# Patient Record
Sex: FEMALE | Race: WHITE | NOT HISPANIC OR LATINO
[De-identification: names, ages, dates, MRNs, and addresses within clinical notes are randomized per-mention and may not be internally consistent; named-entity substitution may affect disease eponyms.]

---

## 2018-05-14 ENCOUNTER — APPOINTMENT (OUTPATIENT)
Dept: ORTHOPEDIC SURGERY | Facility: CLINIC | Age: 58
End: 2018-05-14
Payer: COMMERCIAL

## 2018-05-14 VITALS
WEIGHT: 168 LBS | DIASTOLIC BLOOD PRESSURE: 78 MMHG | SYSTOLIC BLOOD PRESSURE: 121 MMHG | HEIGHT: 65 IN | HEART RATE: 81 BPM | BODY MASS INDEX: 27.99 KG/M2

## 2018-05-14 DIAGNOSIS — Z78.9 OTHER SPECIFIED HEALTH STATUS: ICD-10-CM

## 2018-05-14 DIAGNOSIS — Z82.61 FAMILY HISTORY OF ARTHRITIS: ICD-10-CM

## 2018-05-14 DIAGNOSIS — Z86.39 PERSONAL HISTORY OF OTHER ENDOCRINE, NUTRITIONAL AND METABOLIC DISEASE: ICD-10-CM

## 2018-05-14 PROCEDURE — 99244 OFF/OP CNSLTJ NEW/EST MOD 40: CPT

## 2018-05-14 RX ORDER — MULTIVITAMIN
TABLET ORAL
Refills: 0 | Status: ACTIVE | COMMUNITY

## 2018-05-14 RX ORDER — CHROMIUM 200 MCG
TABLET ORAL
Refills: 0 | Status: ACTIVE | COMMUNITY

## 2018-05-14 RX ORDER — CYANOCOBALAMIN (VITAMIN B-12) 1000 MCG
TABLET ORAL
Refills: 0 | Status: ACTIVE | COMMUNITY

## 2018-05-14 RX ORDER — PSYLLIUM HUSK 0.4 G
CAPSULE ORAL
Refills: 0 | Status: ACTIVE | COMMUNITY

## 2018-05-29 ENCOUNTER — APPOINTMENT (OUTPATIENT)
Dept: ORTHOPEDIC SURGERY | Facility: CLINIC | Age: 58
End: 2018-05-29
Payer: COMMERCIAL

## 2018-05-29 PROCEDURE — 99214 OFFICE O/P EST MOD 30 MIN: CPT

## 2018-06-18 ENCOUNTER — APPOINTMENT (OUTPATIENT)
Dept: MRI IMAGING | Facility: CLINIC | Age: 58
End: 2018-06-18

## 2018-06-22 ENCOUNTER — APPOINTMENT (OUTPATIENT)
Dept: ORTHOPEDIC SURGERY | Facility: CLINIC | Age: 58
End: 2018-06-22
Payer: OTHER MISCELLANEOUS

## 2018-06-22 DIAGNOSIS — M23.92 UNSPECIFIED INTERNAL DERANGEMENT OF LEFT KNEE: ICD-10-CM

## 2018-06-22 PROCEDURE — 99214 OFFICE O/P EST MOD 30 MIN: CPT

## 2020-09-25 ENCOUNTER — OUTPATIENT (OUTPATIENT)
Dept: OUTPATIENT SERVICES | Facility: HOSPITAL | Age: 60
LOS: 1 days | End: 2020-09-25
Payer: COMMERCIAL

## 2020-09-25 VITALS
DIASTOLIC BLOOD PRESSURE: 85 MMHG | HEART RATE: 77 BPM | TEMPERATURE: 98 F | OXYGEN SATURATION: 98 % | HEIGHT: 65 IN | WEIGHT: 170.86 LBS | SYSTOLIC BLOOD PRESSURE: 140 MMHG

## 2020-09-25 DIAGNOSIS — Z01.818 ENCOUNTER FOR OTHER PREPROCEDURAL EXAMINATION: ICD-10-CM

## 2020-09-25 DIAGNOSIS — Z98.890 OTHER SPECIFIED POSTPROCEDURAL STATES: Chronic | ICD-10-CM

## 2020-09-25 DIAGNOSIS — N81.2 INCOMPLETE UTEROVAGINAL PROLAPSE: ICD-10-CM

## 2020-09-25 LAB
A1C WITH ESTIMATED AVERAGE GLUCOSE RESULT: 5.5 % — SIGNIFICANT CHANGE UP (ref 4–5.6)
ANION GAP SERPL CALC-SCNC: 4 MMOL/L — LOW (ref 5–17)
APPEARANCE UR: CLEAR — SIGNIFICANT CHANGE UP
BASOPHILS # BLD AUTO: 0.05 K/UL — SIGNIFICANT CHANGE UP (ref 0–0.2)
BASOPHILS NFR BLD AUTO: 1.5 % — SIGNIFICANT CHANGE UP (ref 0–2)
BILIRUB UR-MCNC: NEGATIVE — SIGNIFICANT CHANGE UP
BUN SERPL-MCNC: 16 MG/DL — SIGNIFICANT CHANGE UP (ref 7–23)
CALCIUM SERPL-MCNC: 9.1 MG/DL — SIGNIFICANT CHANGE UP (ref 8.5–10.1)
CHLORIDE SERPL-SCNC: 109 MMOL/L — HIGH (ref 96–108)
CO2 SERPL-SCNC: 28 MMOL/L — SIGNIFICANT CHANGE UP (ref 22–31)
COLOR SPEC: YELLOW — SIGNIFICANT CHANGE UP
CREAT SERPL-MCNC: 0.74 MG/DL — SIGNIFICANT CHANGE UP (ref 0.5–1.3)
DIFF PNL FLD: ABNORMAL
EOSINOPHIL # BLD AUTO: 0.07 K/UL — SIGNIFICANT CHANGE UP (ref 0–0.5)
EOSINOPHIL NFR BLD AUTO: 2.1 % — SIGNIFICANT CHANGE UP (ref 0–6)
ESTIMATED AVERAGE GLUCOSE: 111 MG/DL — SIGNIFICANT CHANGE UP (ref 68–114)
GLUCOSE SERPL-MCNC: 84 MG/DL — SIGNIFICANT CHANGE UP (ref 70–99)
GLUCOSE UR QL: NEGATIVE MG/DL — SIGNIFICANT CHANGE UP
HCT VFR BLD CALC: 41.2 % — SIGNIFICANT CHANGE UP (ref 34.5–45)
HGB BLD-MCNC: 14 G/DL — SIGNIFICANT CHANGE UP (ref 11.5–15.5)
IMM GRANULOCYTES NFR BLD AUTO: 0.9 % — SIGNIFICANT CHANGE UP (ref 0–1.5)
KETONES UR-MCNC: NEGATIVE — SIGNIFICANT CHANGE UP
LEUKOCYTE ESTERASE UR-ACNC: ABNORMAL
LYMPHOCYTES # BLD AUTO: 0.78 K/UL — LOW (ref 1–3.3)
LYMPHOCYTES # BLD AUTO: 23.9 % — SIGNIFICANT CHANGE UP (ref 13–44)
MCHC RBC-ENTMCNC: 32.8 PG — SIGNIFICANT CHANGE UP (ref 27–34)
MCHC RBC-ENTMCNC: 34 GM/DL — SIGNIFICANT CHANGE UP (ref 32–36)
MCV RBC AUTO: 96.5 FL — SIGNIFICANT CHANGE UP (ref 80–100)
MONOCYTES # BLD AUTO: 0.31 K/UL — SIGNIFICANT CHANGE UP (ref 0–0.9)
MONOCYTES NFR BLD AUTO: 9.5 % — SIGNIFICANT CHANGE UP (ref 2–14)
NEUTROPHILS # BLD AUTO: 2.02 K/UL — SIGNIFICANT CHANGE UP (ref 1.8–7.4)
NEUTROPHILS NFR BLD AUTO: 62.1 % — SIGNIFICANT CHANGE UP (ref 43–77)
NITRITE UR-MCNC: NEGATIVE — SIGNIFICANT CHANGE UP
PH UR: 5 — SIGNIFICANT CHANGE UP (ref 5–8)
PLATELET # BLD AUTO: 198 K/UL — SIGNIFICANT CHANGE UP (ref 150–400)
POTASSIUM SERPL-MCNC: 3.9 MMOL/L — SIGNIFICANT CHANGE UP (ref 3.5–5.3)
POTASSIUM SERPL-SCNC: 3.9 MMOL/L — SIGNIFICANT CHANGE UP (ref 3.5–5.3)
PROT UR-MCNC: 15 MG/DL
RBC # BLD: 4.27 M/UL — SIGNIFICANT CHANGE UP (ref 3.8–5.2)
RBC # FLD: 12.7 % — SIGNIFICANT CHANGE UP (ref 10.3–14.5)
SODIUM SERPL-SCNC: 141 MMOL/L — SIGNIFICANT CHANGE UP (ref 135–145)
SP GR SPEC: 1.02 — SIGNIFICANT CHANGE UP (ref 1.01–1.02)
UROBILINOGEN FLD QL: NEGATIVE MG/DL — SIGNIFICANT CHANGE UP
WBC # BLD: 3.26 K/UL — LOW (ref 3.8–10.5)
WBC # FLD AUTO: 3.26 K/UL — LOW (ref 3.8–10.5)

## 2020-09-25 PROCEDURE — 80048 BASIC METABOLIC PNL TOTAL CA: CPT

## 2020-09-25 PROCEDURE — 93005 ELECTROCARDIOGRAM TRACING: CPT

## 2020-09-25 PROCEDURE — 86850 RBC ANTIBODY SCREEN: CPT

## 2020-09-25 PROCEDURE — G0463: CPT | Mod: 25

## 2020-09-25 PROCEDURE — 83036 HEMOGLOBIN GLYCOSYLATED A1C: CPT

## 2020-09-25 PROCEDURE — 86900 BLOOD TYPING SEROLOGIC ABO: CPT

## 2020-09-25 PROCEDURE — 36415 COLL VENOUS BLD VENIPUNCTURE: CPT

## 2020-09-25 PROCEDURE — 81001 URINALYSIS AUTO W/SCOPE: CPT

## 2020-09-25 PROCEDURE — 86901 BLOOD TYPING SEROLOGIC RH(D): CPT

## 2020-09-25 PROCEDURE — 93010 ELECTROCARDIOGRAM REPORT: CPT

## 2020-09-25 PROCEDURE — 85025 COMPLETE CBC W/AUTO DIFF WBC: CPT

## 2020-09-25 NOTE — H&P PST ADULT - HEALTH CARE MAINTENANCE
flu vaccine not current - information given     Denies travel outside of state or country in the last 14 days.   Denies contact with known Coronavirus positive person.  Denies fever, chills, cough, congestion, runny nose, SOB or difficulty breathing, fatigue, muscle or body ache, headache. loss of taste or smell, N/V/D.

## 2020-09-25 NOTE — H&P PST ADULT - ASSESSMENT
60 year old woman with history of hypothyroid presents to PST for preprocedure exam. Patient is for planned robotic assisted laparoscopic supracervical hysterectomy , BL salpingooophorectomy with suburethral sling with Dr Carter.       Plan:  - PST instructions given ; NPO status instructions to be given by ASU .  - Pt instructed to take following meds with sip of water : synthroid  - Pt instructed to take routine evening medications unless indicated .  -  Stop NSAIDS ( Aspirin Alev Motrin Mobic Diclofenac), herbal supplements , MVI , Vitamin fish oil 7 days prior to surgery  unless   directed by surgeon or cardiologist;   - Medical Optimization  with Dr Duque  - EZ wash instructions given   - Labs EKG  as per surgeon request.   -  Pt instructed to self quarantine .  - Covid Testing scheduled on ___10/3______.  Pt notified and aware.  - Pt denies covid symptoms shortness of breath fever cough .

## 2020-09-25 NOTE — H&P PST ADULT - GENERAL GENITOURINARY SYMPTOMS
feels that she is not emptying her bladder completely/ at times dribbling urine/increased urinary frequency

## 2020-09-25 NOTE — H&P PST ADULT - RS GEN PE MLT RESP DETAILS PC
clear to auscultation bilaterally/no rales/breath sounds equal/good air movement/respirations non-labored/no rhonchi/normal/airway patent

## 2020-09-25 NOTE — H&P PST ADULT - HISTORY OF PRESENT ILLNESS
60 year old woman with history of hypothyroid presents to [facility] for preprocedure exam. Patient is for planned robotic assisted laparoscopic supracervical hysterectomy , BL salpingooophorectomy with suburethral sling with Dr Carter. As per patient she's had uterine prolapse since last year but symptoms has worsened - she endorses to urinary frequency, incomplete emptying of bladder and at times dribbling of urine and nocturia.  She denies recent UTI, 60 year old woman with history of hypothyroid presents to [facility] for preprocedure exam. Patient is for planned robotic assisted laparoscopic supracervical hysterectomy , BL salpingooophorectomy with suburethral sling with Dr Carter. As per patient she's had uterine prolapse since last year but symptoms has worsened - she endorses to urinary frequency, incomplete emptying of bladder and at times dribbling of urine and nocturia.  She denies recent UTI.

## 2020-09-26 DIAGNOSIS — N81.2 INCOMPLETE UTEROVAGINAL PROLAPSE: ICD-10-CM

## 2020-09-26 DIAGNOSIS — Z01.818 ENCOUNTER FOR OTHER PREPROCEDURAL EXAMINATION: ICD-10-CM

## 2020-10-03 ENCOUNTER — OUTPATIENT (OUTPATIENT)
Dept: OUTPATIENT SERVICES | Facility: HOSPITAL | Age: 60
LOS: 1 days | End: 2020-10-03
Payer: COMMERCIAL

## 2020-10-03 DIAGNOSIS — Z98.890 OTHER SPECIFIED POSTPROCEDURAL STATES: Chronic | ICD-10-CM

## 2020-10-03 DIAGNOSIS — Z11.59 ENCOUNTER FOR SCREENING FOR OTHER VIRAL DISEASES: ICD-10-CM

## 2020-10-03 PROCEDURE — U0003: CPT

## 2020-10-04 DIAGNOSIS — Z11.59 ENCOUNTER FOR SCREENING FOR OTHER VIRAL DISEASES: ICD-10-CM

## 2020-10-04 LAB — SARS-COV-2 RNA SPEC QL NAA+PROBE: SIGNIFICANT CHANGE UP

## 2020-10-06 ENCOUNTER — OUTPATIENT (OUTPATIENT)
Dept: INPATIENT UNIT | Facility: HOSPITAL | Age: 60
LOS: 1 days | Discharge: HOME | End: 2020-10-06
Payer: COMMERCIAL

## 2020-10-06 ENCOUNTER — RESULT REVIEW (OUTPATIENT)
Age: 60
End: 2020-10-06

## 2020-10-06 VITALS
HEART RATE: 72 BPM | TEMPERATURE: 99 F | DIASTOLIC BLOOD PRESSURE: 76 MMHG | RESPIRATION RATE: 18 BRPM | WEIGHT: 169.98 LBS | OXYGEN SATURATION: 99 % | HEIGHT: 65 IN | SYSTOLIC BLOOD PRESSURE: 141 MMHG

## 2020-10-06 DIAGNOSIS — N81.2 INCOMPLETE UTEROVAGINAL PROLAPSE: ICD-10-CM

## 2020-10-06 DIAGNOSIS — Z98.890 OTHER SPECIFIED POSTPROCEDURAL STATES: Chronic | ICD-10-CM

## 2020-10-06 DIAGNOSIS — N81.11 CYSTOCELE, MIDLINE: ICD-10-CM

## 2020-10-06 LAB
HCT VFR BLD CALC: 37.1 % — SIGNIFICANT CHANGE UP (ref 34.5–45)
HGB BLD-MCNC: 12.4 G/DL — SIGNIFICANT CHANGE UP (ref 11.5–15.5)

## 2020-10-06 PROCEDURE — 82962 GLUCOSE BLOOD TEST: CPT

## 2020-10-06 PROCEDURE — 57288 REPAIR BLADDER DEFECT: CPT | Mod: AS

## 2020-10-06 PROCEDURE — S2900: CPT

## 2020-10-06 PROCEDURE — 88305 TISSUE EXAM BY PATHOLOGIST: CPT | Mod: 26

## 2020-10-06 PROCEDURE — 57425 LAPAROSCOPY SURG COLPOPEXY: CPT | Mod: AS

## 2020-10-06 PROCEDURE — 88305 TISSUE EXAM BY PATHOLOGIST: CPT

## 2020-10-06 PROCEDURE — 36415 COLL VENOUS BLD VENIPUNCTURE: CPT

## 2020-10-06 PROCEDURE — C1889: CPT

## 2020-10-06 PROCEDURE — C1771: CPT

## 2020-10-06 PROCEDURE — C1781: CPT

## 2020-10-06 PROCEDURE — 85018 HEMOGLOBIN: CPT

## 2020-10-06 PROCEDURE — 85014 HEMATOCRIT: CPT

## 2020-10-06 PROCEDURE — 58542 LSH W/T/O UT 250 G OR LESS: CPT | Mod: AS

## 2020-10-06 RX ORDER — ONDANSETRON 8 MG/1
4 TABLET, FILM COATED ORAL ONCE
Refills: 0 | Status: DISCONTINUED | OUTPATIENT
Start: 2020-10-06 | End: 2020-10-06

## 2020-10-06 RX ORDER — SODIUM CHLORIDE 9 MG/ML
1000 INJECTION, SOLUTION INTRAVENOUS
Refills: 0 | Status: DISCONTINUED | OUTPATIENT
Start: 2020-10-06 | End: 2020-10-06

## 2020-10-06 RX ORDER — OXYCODONE AND ACETAMINOPHEN 5; 325 MG/1; MG/1
2 TABLET ORAL EVERY 4 HOURS
Refills: 0 | Status: DISCONTINUED | OUTPATIENT
Start: 2020-10-06 | End: 2020-10-07

## 2020-10-06 RX ORDER — LEVOTHYROXINE SODIUM 125 MCG
1 TABLET ORAL
Qty: 0 | Refills: 0 | DISCHARGE

## 2020-10-06 RX ORDER — ONDANSETRON 8 MG/1
4 TABLET, FILM COATED ORAL EVERY 6 HOURS
Refills: 0 | Status: DISCONTINUED | OUTPATIENT
Start: 2020-10-06 | End: 2020-10-07

## 2020-10-06 RX ORDER — FENTANYL CITRATE 50 UG/ML
50 INJECTION INTRAVENOUS
Refills: 0 | Status: DISCONTINUED | OUTPATIENT
Start: 2020-10-06 | End: 2020-10-06

## 2020-10-06 RX ORDER — OMEGA-3 ACID ETHYL ESTERS 1 G
0 CAPSULE ORAL
Qty: 0 | Refills: 0 | DISCHARGE

## 2020-10-06 RX ORDER — OXYCODONE HYDROCHLORIDE 5 MG/1
10 TABLET ORAL ONCE
Refills: 0 | Status: DISCONTINUED | OUTPATIENT
Start: 2020-10-06 | End: 2020-10-06

## 2020-10-06 RX ORDER — OXYCODONE AND ACETAMINOPHEN 5; 325 MG/1; MG/1
1 TABLET ORAL EVERY 4 HOURS
Refills: 0 | Status: DISCONTINUED | OUTPATIENT
Start: 2020-10-06 | End: 2020-10-07

## 2020-10-06 RX ORDER — IBUPROFEN 200 MG
600 TABLET ORAL EVERY 6 HOURS
Refills: 0 | Status: DISCONTINUED | OUTPATIENT
Start: 2020-10-06 | End: 2020-10-07

## 2020-10-06 RX ORDER — HYDROMORPHONE HYDROCHLORIDE 2 MG/ML
1 INJECTION INTRAMUSCULAR; INTRAVENOUS; SUBCUTANEOUS EVERY 4 HOURS
Refills: 0 | Status: DISCONTINUED | OUTPATIENT
Start: 2020-10-06 | End: 2020-10-07

## 2020-10-06 RX ADMIN — Medication 600 MILLIGRAM(S): at 23:47

## 2020-10-06 RX ADMIN — OXYCODONE HYDROCHLORIDE 10 MILLIGRAM(S): 5 TABLET ORAL at 19:50

## 2020-10-06 RX ADMIN — FENTANYL CITRATE 50 MICROGRAM(S): 50 INJECTION INTRAVENOUS at 18:51

## 2020-10-06 RX ADMIN — FENTANYL CITRATE 50 MICROGRAM(S): 50 INJECTION INTRAVENOUS at 18:21

## 2020-10-06 NOTE — BRIEF OPERATIVE NOTE - NSICDXBRIEFPREOP_GEN_ALL_CORE_FT
PRE-OP DIAGNOSIS:  Stress incontinence 06-Oct-2020 13:41:16  Loco Carter  Uterovaginal prolapse 06-Oct-2020 13:41:09  Loco Carter

## 2020-10-06 NOTE — BRIEF OPERATIVE NOTE - NSICDXBRIEFPROCEDURE_GEN_ALL_CORE_FT
PROCEDURES:  Cystoscopy 06-Oct-2020 13:40:55  Loco Carter  Supracervical abdominal hysterectomy with bilateral salpingo-oophorectomy (BSO) 06-Oct-2020 13:40:22 robotic Loco Carter  Robot-assisted laparoscopic sacrocolpopexy with creation of suburethral sling 06-Oct-2020 13:38:50  Loco Carter  Exam under anesthesia, gynecologic 06-Oct-2020 13:36:46  Loco Carter

## 2020-10-06 NOTE — ASU DISCHARGE PLAN (ADULT/PEDIATRIC) - CALL YOUR DOCTOR IF YOU HAVE ANY OF THE FOLLOWING:
heavy vaginal bleeding/Fever greater than (need to indicate Fahrenheit or Celsius)/Pain not relieved by Medications/Unable to urinate/Inability to tolerate liquids or foods

## 2020-10-06 NOTE — ASU DISCHARGE PLAN (ADULT/PEDIATRIC) - CARE PROVIDER_API CALL
Loco Carter  OBSTETRICS AND GYNECOLOGY  [address] [place] [address]  Phone: [phone]  Fax: [phone]  Follow Up Time: 2 weeks

## 2020-10-06 NOTE — BRIEF OPERATIVE NOTE - OPERATION/FINDINGS
normal uterus and adnexa; normal abdomen; normal pelvis normal uterus and adnexa; normal abdomen with right lower quadrant; normal pelvis; normal bladder

## 2020-10-06 NOTE — BRIEF OPERATIVE NOTE - NSICDXBRIEFPOSTOP_GEN_ALL_CORE_FT
POST-OP DIAGNOSIS:  Stress incontinence 06-Oct-2020 13:41:33  Loco Carter  Uterovaginal prolapse 06-Oct-2020 13:41:27  Loco Carter

## 2020-10-07 ENCOUNTER — TRANSCRIPTION ENCOUNTER (OUTPATIENT)
Age: 60
End: 2020-10-07

## 2020-10-07 VITALS
OXYGEN SATURATION: 98 % | HEART RATE: 73 BPM | TEMPERATURE: 99 F | SYSTOLIC BLOOD PRESSURE: 108 MMHG | DIASTOLIC BLOOD PRESSURE: 62 MMHG | RESPIRATION RATE: 16 BRPM

## 2020-10-07 RX ADMIN — OXYCODONE AND ACETAMINOPHEN 2 TABLET(S): 5; 325 TABLET ORAL at 10:12

## 2020-10-07 RX ADMIN — OXYCODONE AND ACETAMINOPHEN 2 TABLET(S): 5; 325 TABLET ORAL at 04:17

## 2020-10-07 RX ADMIN — OXYCODONE AND ACETAMINOPHEN 2 TABLET(S): 5; 325 TABLET ORAL at 09:42

## 2020-10-07 RX ADMIN — OXYCODONE AND ACETAMINOPHEN 2 TABLET(S): 5; 325 TABLET ORAL at 05:15

## 2020-10-07 RX ADMIN — Medication 600 MILLIGRAM(S): at 05:48

## 2020-10-07 NOTE — PROGRESS NOTE ADULT - SUBJECTIVE AND OBJECTIVE BOX
Postoperative day: 1  surgery: [facility] ASC MUS  patient resting comfortably  complaints: none  OR findings and course d/w patient in detail -- all questions answered  nursing input solicited; only 100cc out with bladder trial --   Focused ROS: negative    Physical exam:    Vital Signs Last 24 Hrs  T(C): 36.8 (07 Oct 2020 05:10), Max: 37.1 (06 Oct 2020 12:36)  T(F): 98.2 (07 Oct 2020 05:10), Max: 98.7 (06 Oct 2020 12:36)  HR: 68 (07 Oct 2020 05:10) (68 - 79)  BP: 104/54 (07 Oct 2020 05:10) (101/54 - 141/76)  BP(mean): --  RR: 16 (07 Oct 2020 05:10) (12 - 18)  SpO2: 97% (07 Oct 2020 05:10) (96% - 100%)      Abdomen: Soft,  appropriately tender, non-distended  Incision is clean dry and intact  Vagina: minimal bleeding  Ext: lower extremities symmetric and without calf tenderness    LABS:                        12.4   x     )-----------( x        ( 06 Oct 2020 22:25 )             37.1             Allergies    penicillin (Other (Moderate))    Intolerances          Assessment and Plan  Doing well.  Tolerating regular diet.  Routine post op care.  Plan discharge home  if able to void adequately--- routine restrictions  patient given written and verbal discharge instructions

## 2020-10-07 NOTE — DISCHARGE NOTE NURSING/CASE MANAGEMENT/SOCIAL WORK - PATIENT PORTAL LINK FT
You can access the FollowMyHealth Patient Portal offered by [facility] by registering at the following website: [email or web address]. By joining [facility]’s FollowMyHealth portal, you will also be able to view your health information using other applications (apps) compatible with our system.

## 2020-10-12 DIAGNOSIS — Z88.0 ALLERGY STATUS TO PENICILLIN: ICD-10-CM

## 2020-10-12 DIAGNOSIS — N39.3 STRESS INCONTINENCE (FEMALE) (MALE): ICD-10-CM

## 2020-10-12 DIAGNOSIS — D25.1 INTRAMURAL LEIOMYOMA OF UTERUS: ICD-10-CM

## 2020-10-12 DIAGNOSIS — N81.4 UTEROVAGINAL PROLAPSE, UNSPECIFIED: ICD-10-CM

## 2020-10-12 DIAGNOSIS — N80.0 ENDOMETRIOSIS OF UTERUS: ICD-10-CM

## 2020-10-12 DIAGNOSIS — E03.9 HYPOTHYROIDISM, UNSPECIFIED: ICD-10-CM

## 2021-09-17 NOTE — ASU PATIENT PROFILE, ADULT - VISION (WITH CORRECTIVE LENSES IF THE PATIENT USUALLY WEARS THEM):
Partially impaired: cannot see medication labels or newsprint, but can see obstacles in path, and the surrounding layout; can count fingers at arm's length Plan: - discussed the benefits of using nystatin powder, patient declines treatment today. Patient will call clinic when she would like a prescription sent Render In Strict Bullet Format?: No Detail Level: Zone

## 2021-12-09 PROBLEM — N81.4 UTEROVAGINAL PROLAPSE, UNSPECIFIED: Chronic | Status: ACTIVE | Noted: 2020-09-25

## 2022-01-06 ENCOUNTER — APPOINTMENT (OUTPATIENT)
Dept: RADIATION ONCOLOGY | Facility: CLINIC | Age: 62
End: 2022-01-06
Payer: COMMERCIAL

## 2022-01-06 ENCOUNTER — NON-APPOINTMENT (OUTPATIENT)
Age: 62
End: 2022-01-06

## 2022-01-06 VITALS
HEIGHT: 65 IN | SYSTOLIC BLOOD PRESSURE: 142 MMHG | WEIGHT: 160 LBS | BODY MASS INDEX: 26.66 KG/M2 | HEART RATE: 84 BPM | DIASTOLIC BLOOD PRESSURE: 90 MMHG | RESPIRATION RATE: 18 BRPM | OXYGEN SATURATION: 98 %

## 2022-01-06 DIAGNOSIS — Z28.21 IMMUNIZATION NOT CARRIED OUT BECAUSE OF PATIENT REFUSAL: ICD-10-CM

## 2022-01-06 DIAGNOSIS — Z80.0 FAMILY HISTORY OF MALIGNANT NEOPLASM OF DIGESTIVE ORGANS: ICD-10-CM

## 2022-01-06 PROCEDURE — 77263 THER RADIOLOGY TX PLNG CPLX: CPT

## 2022-01-06 PROCEDURE — 99204 OFFICE O/P NEW MOD 45 MIN: CPT | Mod: 25

## 2022-01-06 RX ORDER — LETROZOLE TABLETS 2.5 MG/1
2.5 TABLET, FILM COATED ORAL
Refills: 0 | Status: ACTIVE | COMMUNITY

## 2022-01-06 RX ORDER — METHYLPREDNISOLONE 4 MG/1
4 TABLET ORAL
Qty: 21 | Refills: 0 | Status: DISCONTINUED | COMMUNITY
Start: 2018-06-05 | End: 2022-01-06

## 2022-01-06 NOTE — PHYSICAL EXAM
[Breast Palpation Mass] : no palpable masses [Breast Abnormal Lactation (Galactorrhea)] : no nipple discharge [No UE Edema] : there is no upper extremity edema [Normal] : oriented to person, place and time, the affect was normal, the mood was normal and not anxious [de-identified] : A cup. well healed rt circumareolar medial scara nd rt axillary scar well healed

## 2022-01-06 NOTE — HISTORY OF PRESENT ILLNESS
[FreeTextEntry1] : The patient is a pleasant 61 year old female with right breast cancer. She palpated a right breast mass and mammo/sono was performed in October 2021. Mammo was negative for malignancy but showed calcifications. Ultrasound showed an irregular right breast mass with indistinct margins measuring 15 x 13 x 11 mm at 2:00, 2 cm FN, as well as calcifications. Right inner breast biopsy performed on 10/27/21 showed invasive ductal carcinoma, poorly differentiated, with associated calcifications. Largest fragment of invasive carcinoma measuring 1.2 cm. ER strongly positive, RI moderately positive, HER2 negative by immunohistochemistry. Ki 67 20%. Right breast lumpectomy and axillary sentinel node biopsy performed by Dr. Maldonado on 11/19/21. Pathology showed invasive ductal carcinoma measuring 15 mm. Dany grade 3. Lymphovascular invasion was present and extends away from the contours of the carcinoma. DCIS, high grade, solid pattern with central necrosis and calcification, admixed with invasive carcinoma. DCIS spans approximately 15 mm and represents approximately 20% of the total tumor volume. Margins negative. ER % positive, RI 61-70% positive, HER negative by immunohistochemistry. Ki 67 20%. 1/ 2 right axillary lymph nodes positive for metastatic carcinoma, 5.8 mm in greatest contiguous dimension. Extranodal extension not seen. Oncotype recurrence score was 16. She is currently taking Letrozole. She presents to discuss the role of radiation therapy in her care. \par

## 2022-01-06 NOTE — OB/GYN HISTORY
[Menopause Age: ____] : patient was [unfilled] years old at menopause [___] : Full Term: [unfilled] [History of Birth Control Pills] : Patient has no history of taking birth control pills [History of Hormone Replacement Therapy] : no history of hormone replacement therapy

## 2022-01-13 PROCEDURE — 77290 THER RAD SIMULAJ FIELD CPLX: CPT

## 2022-01-13 PROCEDURE — 77332 RADIATION TREATMENT AID(S): CPT

## 2022-01-20 ENCOUNTER — TRANSCRIPTION ENCOUNTER (OUTPATIENT)
Age: 62
End: 2022-01-20

## 2022-01-25 PROCEDURE — 77331 SPECIAL RADIATION DOSIMETRY: CPT

## 2022-01-26 PROCEDURE — 77301 RADIOTHERAPY DOSE PLAN IMRT: CPT

## 2022-01-26 PROCEDURE — 77338 DESIGN MLC DEVICE FOR IMRT: CPT

## 2022-01-26 PROCEDURE — 77300 RADIATION THERAPY DOSE PLAN: CPT

## 2022-01-31 PROCEDURE — G6015: CPT

## 2022-01-31 PROCEDURE — G6017: CPT

## 2022-02-01 ENCOUNTER — NON-APPOINTMENT (OUTPATIENT)
Age: 62
End: 2022-02-01

## 2022-02-01 VITALS
DIASTOLIC BLOOD PRESSURE: 86 MMHG | OXYGEN SATURATION: 98 % | SYSTOLIC BLOOD PRESSURE: 148 MMHG | BODY MASS INDEX: 26.66 KG/M2 | HEIGHT: 65 IN | RESPIRATION RATE: 18 BRPM | WEIGHT: 160 LBS | HEART RATE: 76 BPM

## 2022-02-01 PROCEDURE — G6017: CPT

## 2022-02-01 PROCEDURE — G6015: CPT

## 2022-02-01 NOTE — HISTORY OF PRESENT ILLNESS
[FreeTextEntry1] : The patient is a pleasant 61 year old female with right breast cancer. She palpated a right breast mass and mammo/sono was performed in October 2021. Mammo was negative for malignancy but showed calcifications. Ultrasound showed an irregular right breast mass with indistinct margins measuring 15 x 13 x 11 mm at 2:00, 2 cm FN, as well as calcifications. Right inner breast biopsy performed on 10/27/21 showed invasive ductal carcinoma, poorly differentiated, with associated calcifications. Largest fragment of invasive carcinoma measuring 1.2 cm. ER strongly positive, WV moderately positive, HER2 negative by immunohistochemistry. Ki 67 20%. Right breast lumpectomy and axillary sentinel node biopsy performed by Dr. Maldonado on 11/19/21. Pathology showed invasive ductal carcinoma measuring 15 mm. Dany grade 3. Lymphovascular invasion was present and extends away from the contours of the carcinoma. DCIS, high grade, solid pattern with central necrosis and calcification, admixed with invasive carcinoma. DCIS spans approximately 15 mm and represents approximately 20% of the total tumor volume. Margins negative. ER % positive, WV 61-70% positive, HER negative by immunohistochemistry. Ki 67 20%. 1/ 2 right axillary lymph nodes positive for metastatic carcinoma, 5.8 mm in greatest contiguous dimension. Extranodal extension not seen. Oncotype recurrence score was 16. She is currently taking Letrozole. She presents to discuss the role of radiation therapy in her care. \par \par She presents for an OTV 2/20 fx.  Feels well. Using rejuvaskin.\par

## 2022-02-01 NOTE — PHYSICAL EXAM
[Normal] : no respiratory distress, lungs were clear to auscultation bilaterally [No UE Edema] : there is no upper extremity edema [de-identified] : no breast erythema

## 2022-02-02 PROCEDURE — G6015: CPT

## 2022-02-02 PROCEDURE — G6017: CPT

## 2022-02-03 PROCEDURE — G6015: CPT

## 2022-02-03 PROCEDURE — G6002: CPT

## 2022-02-04 PROCEDURE — G6017: CPT

## 2022-02-04 PROCEDURE — 77336 RADIATION PHYSICS CONSULT: CPT

## 2022-02-04 PROCEDURE — 77427 RADIATION TX MANAGEMENT X5: CPT

## 2022-02-04 PROCEDURE — G6015: CPT

## 2022-02-07 PROCEDURE — G6015: CPT

## 2022-02-08 PROCEDURE — G6015: CPT

## 2022-02-08 PROCEDURE — G6017: CPT

## 2022-02-09 ENCOUNTER — NON-APPOINTMENT (OUTPATIENT)
Age: 62
End: 2022-02-09

## 2022-02-10 VITALS
OXYGEN SATURATION: 98 % | HEART RATE: 77 BPM | SYSTOLIC BLOOD PRESSURE: 147 MMHG | DIASTOLIC BLOOD PRESSURE: 91 MMHG | HEIGHT: 65 IN | BODY MASS INDEX: 26.66 KG/M2 | WEIGHT: 160 LBS | RESPIRATION RATE: 18 BRPM

## 2022-02-10 PROCEDURE — G6002: CPT

## 2022-02-10 PROCEDURE — G6015: CPT

## 2022-02-10 NOTE — REVIEW OF SYSTEMS
[Fatigue: Grade 1 - Fatigue relieved by rest] : Fatigue: Grade 1 - Fatigue relieved by rest [Skin Hyperpigmentation: Grade 0] : Skin Hyperpigmentation: Grade 0 [Dermatitis Radiation: Grade 0] : Dermatitis Radiation: Grade 0

## 2022-02-10 NOTE — HISTORY OF PRESENT ILLNESS
[FreeTextEntry1] : The patient is a pleasant 61 year old female with right breast cancer. She palpated a right breast mass and mammo/sono was performed in October 2021. Mammo was negative for malignancy but showed calcifications. Ultrasound showed an irregular right breast mass with indistinct margins measuring 15 x 13 x 11 mm at 2:00, 2 cm FN, as well as calcifications. Right inner breast biopsy performed on 10/27/21 showed invasive ductal carcinoma, poorly differentiated, with associated calcifications. Largest fragment of invasive carcinoma measuring 1.2 cm. ER strongly positive, ID moderately positive, HER2 negative by immunohistochemistry. Ki 67 20%. Right breast lumpectomy and axillary sentinel node biopsy performed by Dr. Maldonado on 11/19/21. Pathology showed invasive ductal carcinoma measuring 15 mm. Dany grade 3. Lymphovascular invasion was present and extends away from the contours of the carcinoma. DCIS, high grade, solid pattern with central necrosis and calcification, admixed with invasive carcinoma. DCIS spans approximately 15 mm and represents approximately 20% of the total tumor volume. Margins negative. ER % positive, ID 61-70% positive, HER negative by immunohistochemistry. Ki 67 20%. 1/ 2 right axillary lymph nodes positive for metastatic carcinoma, 5.8 mm in greatest contiguous dimension. Extranodal extension not seen. Oncotype recurrence score was 16. She is currently taking Letrozole. She presents to discuss the role of radiation therapy in her care. \par \par She presents for an OTV 8/20 fx.  Feels well. Using rejuvaskin. Some fatigue noted. Working long hours FT. No skin reaction but report sensitvity on palpation. + fatigue\par

## 2022-02-10 NOTE — PHYSICAL EXAM
[Normal] : no respiratory distress, lungs were clear to auscultation bilaterally [No UE Edema] : there is no upper extremity edema [de-identified] : no breast erythema

## 2022-02-10 NOTE — DISEASE MANAGEMENT
[Pathological] : TNM Stage: p [II] : II [TTNM] : 1c [NTNM] : 1a [MTNM] : 0 [de-identified] : 1358 [de-identified] : 6269 [de-identified] : right breast

## 2022-02-11 PROCEDURE — G6015: CPT

## 2022-02-11 PROCEDURE — G6017: CPT

## 2022-02-14 PROCEDURE — 77427 RADIATION TX MANAGEMENT X5: CPT

## 2022-02-14 PROCEDURE — 77336 RADIATION PHYSICS CONSULT: CPT

## 2022-02-14 PROCEDURE — G6015: CPT

## 2022-02-15 ENCOUNTER — NON-APPOINTMENT (OUTPATIENT)
Age: 62
End: 2022-02-15

## 2022-02-15 PROCEDURE — G6017: CPT

## 2022-02-15 PROCEDURE — G6015: CPT

## 2022-02-15 NOTE — PHYSICAL EXAM
[Normal] : no respiratory distress, lungs were clear to auscultation bilaterally [No UE Edema] : there is no upper extremity edema [de-identified] : G1 right breast erythema without desquamation

## 2022-02-15 NOTE — DISEASE MANAGEMENT
[Pathological] : TNM Stage: p [II] : II [TTNM] : 1c [NTNM] : 1a [MTNM] : 0 [de-identified] : 5935 [de-identified] : 5724 [de-identified] : right breast

## 2022-02-15 NOTE — HISTORY OF PRESENT ILLNESS
[FreeTextEntry1] : The patient is a pleasant 61 year old female with right breast cancer. She palpated a right breast mass and mammo/sono was performed in October 2021. Mammo was negative for malignancy but showed calcifications. Ultrasound showed an irregular right breast mass with indistinct margins measuring 15 x 13 x 11 mm at 2:00, 2 cm FN, as well as calcifications. Right inner breast biopsy performed on 10/27/21 showed invasive ductal carcinoma, poorly differentiated, with associated calcifications. Largest fragment of invasive carcinoma measuring 1.2 cm. ER strongly positive, LA moderately positive, HER2 negative by immunohistochemistry. Ki 67 20%. Right breast lumpectomy and axillary sentinel node biopsy performed by Dr. Maldonado on 11/19/21. Pathology showed invasive ductal carcinoma measuring 15 mm. Dany grade 3. Lymphovascular invasion was present and extends away from the contours of the carcinoma. DCIS, high grade, solid pattern with central necrosis and calcification, admixed with invasive carcinoma. DCIS spans approximately 15 mm and represents approximately 20% of the total tumor volume. Margins negative. ER % positive, LA 61-70% positive, HER negative by immunohistochemistry. Ki 67 20%. 1/ 2 right axillary lymph nodes positive for metastatic carcinoma, 5.8 mm in greatest contiguous dimension. Extranodal extension not seen. Oncotype recurrence score was 16. She is currently taking Letrozole. She presents to discuss the role of radiation therapy in her care. \par \par She presents for an OTV 11/20 fx. Feels well. Using rejuvaskin. Some fatigue noted. Working long hours FT. No skin reaction but report sensitvity on palpation. Fatigue noted. No changes. I setup her e boost on table today.\par

## 2022-02-16 PROCEDURE — G6015: CPT

## 2022-02-16 PROCEDURE — G6017: CPT

## 2022-02-17 PROCEDURE — G6002: CPT

## 2022-02-17 PROCEDURE — G6015: CPT

## 2022-02-18 PROCEDURE — G6017: CPT

## 2022-02-18 PROCEDURE — G6015: CPT

## 2022-02-22 PROCEDURE — G6015: CPT

## 2022-02-22 PROCEDURE — G6017: CPT

## 2022-02-22 PROCEDURE — 77427 RADIATION TX MANAGEMENT X5: CPT

## 2022-02-23 ENCOUNTER — NON-APPOINTMENT (OUTPATIENT)
Age: 62
End: 2022-02-23

## 2022-02-23 VITALS — HEART RATE: 74 BPM | WEIGHT: 160 LBS | HEIGHT: 65 IN | BODY MASS INDEX: 26.66 KG/M2 | RESPIRATION RATE: 16 BRPM

## 2022-02-23 PROCEDURE — G6015: CPT

## 2022-02-23 PROCEDURE — G6017: CPT

## 2022-02-24 PROCEDURE — G6012: CPT

## 2022-02-25 PROCEDURE — G6012: CPT

## 2022-02-27 NOTE — PHYSICAL EXAM
[Normal] : no respiratory distress, lungs were clear to auscultation bilaterally [No UE Edema] : there is no upper extremity edema [de-identified] : G1 right breast erythema without desquamation

## 2022-02-27 NOTE — DISEASE MANAGEMENT
[Pathological] : TNM Stage: p [II] : II [TTNM] : 1c [NTNM] : 1a [MTNM] : 0 [de-identified] : 8279 [de-identified] : 1329 [de-identified] : right breast

## 2022-02-27 NOTE — HISTORY OF PRESENT ILLNESS
[FreeTextEntry1] : The patient is a pleasant 61 year old female with right breast cancer. She palpated a right breast mass and mammo/sono was performed in October 2021. Mammo was negative for malignancy but showed calcifications. Ultrasound showed an irregular right breast mass with indistinct margins measuring 15 x 13 x 11 mm at 2:00, 2 cm FN, as well as calcifications. Right inner breast biopsy performed on 10/27/21 showed invasive ductal carcinoma, poorly differentiated, with associated calcifications. Largest fragment of invasive carcinoma measuring 1.2 cm. ER strongly positive, AZ moderately positive, HER2 negative by immunohistochemistry. Ki 67 20%. Right breast lumpectomy and axillary sentinel node biopsy performed by Dr. Maldonado on 11/19/21. Pathology showed invasive ductal carcinoma measuring 15 mm. Dany grade 3. Lymphovascular invasion was present and extends away from the contours of the carcinoma. DCIS, high grade, solid pattern with central necrosis and calcification, admixed with invasive carcinoma. DCIS spans approximately 15 mm and represents approximately 20% of the total tumor volume. Margins negative. ER % positive, AZ 61-70% positive, HER negative by immunohistochemistry. Ki 67 20%. 1/ 2 right axillary lymph nodes positive for metastatic carcinoma, 5.8 mm in greatest contiguous dimension. Extranodal extension not seen. Oncotype recurrence score was 16. She is currently taking Letrozole. She presents to discuss the role of radiation therapy in her care. \par \par She presents for an OTV 16/20 fx. Feels well. Using Rejuvaskin. Some fatigue noted. Working long hours FT. No skin reaction but report sensitivity on palpation. Fatigue noted. No changes. I setup her e boost on table today.\par

## 2022-02-28 PROCEDURE — G6012: CPT

## 2022-03-01 ENCOUNTER — NON-APPOINTMENT (OUTPATIENT)
Age: 62
End: 2022-03-01

## 2022-03-01 VITALS
WEIGHT: 167 LBS | OXYGEN SATURATION: 98 % | BODY MASS INDEX: 27.82 KG/M2 | HEIGHT: 65 IN | RESPIRATION RATE: 17 BRPM | HEART RATE: 88 BPM

## 2022-03-01 PROCEDURE — 77427 RADIATION TX MANAGEMENT X5: CPT

## 2022-03-01 PROCEDURE — 77336 RADIATION PHYSICS CONSULT: CPT

## 2022-03-01 PROCEDURE — G6012: CPT

## 2022-03-01 NOTE — HISTORY OF PRESENT ILLNESS
[FreeTextEntry1] : The patient is a pleasant 61 year old female with right breast cancer. She palpated a right breast mass and mammo/sono was performed in October 2021. Mammo was negative for malignancy but showed calcifications. Ultrasound showed an irregular right breast mass with indistinct margins measuring 15 x 13 x 11 mm at 2:00, 2 cm FN, as well as calcifications. Right inner breast biopsy performed on 10/27/21 showed invasive ductal carcinoma, poorly differentiated, with associated calcifications. Largest fragment of invasive carcinoma measuring 1.2 cm. ER strongly positive, AR moderately positive, HER2 negative by immunohistochemistry. Ki 67 20%. Right breast lumpectomy and axillary sentinel node biopsy performed by Dr. Maldonado on 11/19/21. Pathology showed invasive ductal carcinoma measuring 15 mm. Dany grade 3. Lymphovascular invasion was present and extends away from the contours of the carcinoma. DCIS, high grade, solid pattern with central necrosis and calcification, admixed with invasive carcinoma. DCIS spans approximately 15 mm and represents approximately 20% of the total tumor volume. Margins negative. ER % positive, AR 61-70% positive, HER negative by immunohistochemistry. Ki 67 20%. 1/ 2 right axillary lymph nodes positive for metastatic carcinoma, 5.8 mm in greatest contiguous dimension. Extranodal extension not seen. Oncotype recurrence score was 16. She is currently taking Letrozole. She presents to discuss the role of radiation therapy in her care. \par \par She presents for an OTV 20/20 fx. Feels well. Using Rejuvaskin. Increasing fatigue noted. Working long hours FT. No skin reaction but report sensitivity on palpation.

## 2022-03-01 NOTE — DISEASE MANAGEMENT
[Pathological] : TNM Stage: p [II] : II [TTNM] : 1c [NTNM] : 1a [MTNM] : 0 [de-identified] : 1725 [de-identified] : 4210 [de-identified] : right breast

## 2022-03-01 NOTE — PHYSICAL EXAM
[Normal] : no respiratory distress, lungs were clear to auscultation bilaterally [No UE Edema] : there is no upper extremity edema [de-identified] : G1 right breast erythema without desquamation

## 2022-04-08 ENCOUNTER — APPOINTMENT (OUTPATIENT)
Dept: RADIATION ONCOLOGY | Facility: CLINIC | Age: 62
End: 2022-04-08
Payer: COMMERCIAL

## 2022-04-08 VITALS
RESPIRATION RATE: 18 BRPM | BODY MASS INDEX: 27.32 KG/M2 | SYSTOLIC BLOOD PRESSURE: 152 MMHG | HEIGHT: 65 IN | OXYGEN SATURATION: 97 % | HEART RATE: 84 BPM | WEIGHT: 164 LBS | DIASTOLIC BLOOD PRESSURE: 88 MMHG

## 2022-04-08 PROCEDURE — 99024 POSTOP FOLLOW-UP VISIT: CPT

## 2022-04-08 NOTE — HISTORY OF PRESENT ILLNESS
[FreeTextEntry1] : The patient is a pleasant 62 year old female with right breast cancer. She palpated a right breast mass and mammo/sono was performed in October 2021. Mammo was negative for malignancy but showed calcifications. Ultrasound showed an irregular right breast mass with indistinct margins measuring 15 x 13 x 11 mm at 2:00, 2 cm FN, as well as calcifications. Right inner breast biopsy performed on 10/27/21 showed invasive ductal carcinoma, poorly differentiated, with associated calcifications. Largest fragment of invasive carcinoma measuring 1.2 cm. ER strongly positive, DE moderately positive, HER2 negative by immunohistochemistry. Ki 67 20%. Right breast lumpectomy and axillary sentinel node biopsy performed by Dr. Maldonado on 11/19/21. Pathology showed invasive ductal carcinoma measuring 15 mm. Dany grade 3. Lymphovascular invasion was present and extends away from the contours of the carcinoma. DCIS, high grade, solid pattern with central necrosis and calcification, admixed with invasive carcinoma. DCIS spans approximately 15 mm and represents approximately 20% of the total tumor volume. Margins negative. ER % positive, DE 61-70% positive, HER negative by immunohistochemistry. Ki 67 20%. 1/ 2 right axillary lymph nodes positive for metastatic carcinoma, 5.8 mm in greatest contiguous dimension. Extranodal extension not seen. Oncotype recurrence score was 16. She is currently taking Letrozole. She completed a dose of 5240 cGy to the right breast with high tangents/axilary and R IM nodes on 3/1/22. \par \par She presents for a one month PTE. Feels well. Using Rejuvaskin.  Working long hours FT. Skin healed. She is taking Letrozole without any toxicity. Trying to lower her BP naturally.

## 2022-04-08 NOTE — PHYSICAL EXAM
[Normal] : well developed, well nourished, in no acute distress [Thin] : thin [No UE Edema] : there is no upper extremity edema [de-identified] : R breast smaller than left with puckering R medial areolar scar. Mild hyperpigmentation R breast

## 2022-08-12 ENCOUNTER — NON-APPOINTMENT (OUTPATIENT)
Age: 62
End: 2022-08-12

## 2022-08-12 DIAGNOSIS — N95.2 POSTMENOPAUSAL ATROPHIC VAGINITIS: ICD-10-CM

## 2022-08-12 DIAGNOSIS — Z87.42 PERSONAL HISTORY OF OTHER DISEASES OF THE FEMALE GENITAL TRACT: ICD-10-CM

## 2022-08-12 DIAGNOSIS — Z78.0 ASYMPTOMATIC MENOPAUSAL STATE: ICD-10-CM

## 2022-08-12 DIAGNOSIS — M85.80 OTHER SPECIFIED DISORDERS OF BONE DENSITY AND STRUCTURE, UNSPECIFIED SITE: ICD-10-CM

## 2022-08-12 DIAGNOSIS — Z98.890 OTHER SPECIFIED POSTPROCEDURAL STATES: ICD-10-CM

## 2022-08-12 DIAGNOSIS — Z92.89 PERSONAL HISTORY OF OTHER MEDICAL TREATMENT: ICD-10-CM

## 2022-08-12 DIAGNOSIS — N81.10 CYSTOCELE, UNSPECIFIED: ICD-10-CM

## 2022-08-22 ENCOUNTER — APPOINTMENT (OUTPATIENT)
Dept: OBGYN | Facility: CLINIC | Age: 62
End: 2022-08-22

## 2022-08-22 ENCOUNTER — RESULT CHARGE (OUTPATIENT)
Age: 62
End: 2022-08-22

## 2022-08-22 ENCOUNTER — LABORATORY RESULT (OUTPATIENT)
Age: 62
End: 2022-08-22

## 2022-08-22 VITALS
WEIGHT: 166 LBS | HEIGHT: 65 IN | SYSTOLIC BLOOD PRESSURE: 140 MMHG | BODY MASS INDEX: 27.66 KG/M2 | DIASTOLIC BLOOD PRESSURE: 88 MMHG

## 2022-08-22 DIAGNOSIS — Z00.00 ENCOUNTER FOR GENERAL ADULT MEDICAL EXAMINATION W/OUT ABNORMAL FINDINGS: ICD-10-CM

## 2022-08-22 DIAGNOSIS — Z12.12 ENCOUNTER FOR SCREENING FOR MALIGNANT NEOPLASM OF COLON: ICD-10-CM

## 2022-08-22 DIAGNOSIS — Z01.419 ENCOUNTER FOR GYNECOLOGICAL EXAMINATION (GENERAL) (ROUTINE) W/OUT ABNORMAL FINDINGS: ICD-10-CM

## 2022-08-22 DIAGNOSIS — Z12.11 ENCOUNTER FOR SCREENING FOR MALIGNANT NEOPLASM OF COLON: ICD-10-CM

## 2022-08-22 LAB
BILIRUB UR QL STRIP: NORMAL
CLARITY UR: NORMAL
COLLECTION METHOD: NORMAL
DATE COLLECTED: NORMAL
GLUCOSE UR-MCNC: NORMAL
HCG UR QL: 0.2 EU/DL
HEMOCCULT SP1 STL QL: NEGATIVE
HEMOGLOBIN: 13.1
HGB UR QL STRIP.AUTO: NORMAL
KETONES UR-MCNC: NORMAL
LEUKOCYTE ESTERASE UR QL STRIP: NORMAL
NITRITE UR QL STRIP: POSITIVE
PH UR STRIP: 5.5
PROT UR STRIP-MCNC: NORMAL
QUALITY CONTROL: YES
SP GR UR STRIP: 1.02

## 2022-08-22 PROCEDURE — 81003 URINALYSIS AUTO W/O SCOPE: CPT | Mod: QW

## 2022-08-22 PROCEDURE — 85018 HEMOGLOBIN: CPT | Mod: QW

## 2022-08-22 PROCEDURE — 82270 OCCULT BLOOD FECES: CPT

## 2022-08-22 PROCEDURE — 99396 PREV VISIT EST AGE 40-64: CPT

## 2022-08-23 LAB
APPEARANCE: ABNORMAL
BILIRUBIN URINE: NEGATIVE
BLOOD URINE: NEGATIVE
COLOR: YELLOW
GLUCOSE QUALITATIVE U: NEGATIVE
KETONES URINE: NEGATIVE
LEUKOCYTE ESTERASE URINE: ABNORMAL
NITRITE URINE: POSITIVE
PH URINE: 5.5
PROTEIN URINE: NORMAL
SPECIFIC GRAVITY URINE: 1.02
UROBILINOGEN URINE: NORMAL

## 2022-08-26 DIAGNOSIS — N39.0 URINARY TRACT INFECTION, SITE NOT SPECIFIED: ICD-10-CM

## 2022-08-26 LAB — BACTERIA UR CULT: ABNORMAL

## 2022-08-29 LAB — CYTOLOGY CVX/VAG DOC THIN PREP: ABNORMAL

## 2022-09-15 NOTE — DISCUSSION/SUMMARY
[FreeTextEntry1] : Patient counseled regarding genetics form. Genetics screening negative See attached genetics form. \par \par Patient questions answered\par Patient agreeable with plan.\par

## 2022-09-15 NOTE — PHYSICAL EXAM
[Chaperone Present] : A chaperone was present in the examining room during all aspects of the physical examination [Appropriately responsive] : appropriately responsive [No Lymphadenopathy] : no lymphadenopathy [Soft] : soft [Non-tender] : non-tender [No Discharge] : no discharge [Breast Reconstruction Right] : breast reconstruction [The Left Breast Was Examined] : a normal appearance [No Masses] : no breast masses were palpable [Vulvar Atrophy] : vulvar atrophy [Labia Majora] : normal [Labia Minora] : normal [Atrophy] : atrophy [Normal] : normal [Absent] : absent [Uterine Adnexae] : normal [FreeTextEntry1] : Teresa RODRIGUEZ-ROMI chaperoned during entire physical exam [Occult Blood Positive] : was negative for occult blood analysis

## 2022-09-15 NOTE — PLAN
[FreeTextEntry1] : Patient to follow up in 1 year for annual GYN exam\par Mammogram and bilateral breast US due: per FS, Zinkin and Akhund\par Colonogard : last done in 2021 which was negative, due in 2024\par Bone density due: 2023\par Pap ordered\par \par All questions answered, patient agreeable with plan.\par \par Written by Teresa CABRAL, acting as a scribe for Dr. Carter. This note accurately reflects the work and decisions made by me.\par

## 2022-09-15 NOTE — HISTORY OF PRESENT ILLNESS
[TextBox_4] : Patient is a 61 y/o female here today for annual visit. Patient recent dx of Breast Ca estrogen receptor positive, followed by Dr. Hartman, Dr. Maldonado, and Dr. Epps. S/P right breast lumpectomy. \par patient hx LSH/ASC

## 2022-10-03 ENCOUNTER — APPOINTMENT (OUTPATIENT)
Dept: RADIATION ONCOLOGY | Facility: CLINIC | Age: 62
End: 2022-10-03

## 2022-10-03 VITALS
HEIGHT: 65 IN | WEIGHT: 166 LBS | HEART RATE: 78 BPM | RESPIRATION RATE: 18 BRPM | BODY MASS INDEX: 27.66 KG/M2 | OXYGEN SATURATION: 98 % | SYSTOLIC BLOOD PRESSURE: 122 MMHG | DIASTOLIC BLOOD PRESSURE: 80 MMHG

## 2022-10-03 PROCEDURE — 99213 OFFICE O/P EST LOW 20 MIN: CPT

## 2022-10-03 NOTE — PHYSICAL EXAM
[Normal] : no respiratory distress, lungs were clear to auscultation bilaterally [de-identified] : tightness R breast. no masses

## 2022-10-03 NOTE — HISTORY OF PRESENT ILLNESS
[FreeTextEntry1] : The patient is a pleasant 62 year old female with right breast cancer. She palpated a right breast mass and mammo/sono was performed in October 2021. Mammo was negative for malignancy but showed calcifications. Ultrasound showed an irregular right breast mass with indistinct margins measuring 15 x 13 x 11 mm at 2:00, 2 cm FN, as well as calcifications. Right inner breast biopsy performed on 10/27/21 showed invasive ductal carcinoma, poorly differentiated, with associated calcifications. Largest fragment of invasive carcinoma measuring 1.2 cm. ER strongly positive, FL moderately positive, HER2 negative by immunohistochemistry. Ki 67 20%. Right breast lumpectomy and axillary sentinel node biopsy performed by Dr. Maldonado on 11/19/21. Pathology showed invasive ductal carcinoma measuring 15 mm. Dany grade 3. Lymphovascular invasion was present and extends away from the contours of the carcinoma. DCIS, high grade, solid pattern with central necrosis and calcification, admixed with invasive carcinoma. DCIS spans approximately 15 mm and represents approximately 20% of the total tumor volume. Margins negative. ER % positive, FL 61-70% positive, HER negative by immunohistochemistry. Ki 67 20%. 1/ 2 right axillary lymph nodes positive for metastatic carcinoma, 5.8 mm in greatest contiguous dimension. Extranodal extension not seen. Oncotype recurrence score was 16. She is currently taking Letrozole. She completed a dose of 5240 cGy to the right breast with high tangents/axilary and R IM nodes on 3/1/22. \par \par She presents for a 7 month follow up. Feels well. Using Rejuvaskin.  Working long hours FT. Skin healed. She is taking Letrozole without any toxicity. Trying to lower her BP naturally. mammogram scheduled this month, she f/u with Dr. Maldonado. Tenderness R latissmus and has been exercising with weights.

## 2023-06-07 ENCOUNTER — APPOINTMENT (OUTPATIENT)
Dept: RADIOLOGY | Facility: CLINIC | Age: 63
End: 2023-06-07
Payer: COMMERCIAL

## 2023-06-07 ENCOUNTER — OUTPATIENT (OUTPATIENT)
Dept: OUTPATIENT SERVICES | Facility: HOSPITAL | Age: 63
LOS: 1 days | End: 2023-06-07
Payer: COMMERCIAL

## 2023-06-07 DIAGNOSIS — Z00.00 ENCOUNTER FOR GENERAL ADULT MEDICAL EXAMINATION WITHOUT ABNORMAL FINDINGS: ICD-10-CM

## 2023-06-07 DIAGNOSIS — Z98.890 OTHER SPECIFIED POSTPROCEDURAL STATES: Chronic | ICD-10-CM

## 2023-06-07 PROCEDURE — 77080 DXA BONE DENSITY AXIAL: CPT

## 2023-06-07 PROCEDURE — 77080 DXA BONE DENSITY AXIAL: CPT | Mod: 26

## 2023-06-08 ENCOUNTER — NON-APPOINTMENT (OUTPATIENT)
Age: 63
End: 2023-06-08

## 2023-06-28 ENCOUNTER — APPOINTMENT (OUTPATIENT)
Dept: OBGYN | Facility: CLINIC | Age: 63
End: 2023-06-28

## 2023-07-12 ENCOUNTER — APPOINTMENT (OUTPATIENT)
Dept: OBGYN | Facility: CLINIC | Age: 63
End: 2023-07-12
Payer: COMMERCIAL

## 2023-07-12 DIAGNOSIS — M81.0 AGE-RELATED OSTEOPOROSIS W/OUT CURRENT PATHOLOGICAL FRACTURE: ICD-10-CM

## 2023-07-12 PROCEDURE — 99214 OFFICE O/P EST MOD 30 MIN: CPT

## 2023-07-12 RX ORDER — IBANDRONATE SODIUM 150 MG/1
150 TABLET ORAL
Qty: 1 | Refills: 4 | Status: ACTIVE | COMMUNITY
Start: 2023-07-12 | End: 1900-01-01

## 2023-07-13 NOTE — HISTORY OF PRESENT ILLNESS
[FreeTextEntry1] : 63 year old female presents for bone density consult. she femoral neck was previously -2.2 and has not dropped to -2.6. \par \par her osteo labs were drawn; TSH was low. she is to follow up with her PCP regarding this. \par \par

## 2023-07-13 NOTE — PLAN
[FreeTextEntry1] : \par \par no vaginal exam performed today\par osteoporosis educational material provided. \par \par Patient educated on proper technique to take medication. Medication to be taken once monthly. Instructions including taking on an empty stomach, first thing in the morning with no other medications. Pt is to wait at least 1 hour before consuming food or other medications. Pt advised to drink 6-8oz of water with medication and do not lay supine for at least one hour. Pt to call with any side effects. All questions answered; pt agrees with plan. Pt also provided with written material on medication and instructions on how to take it.\par \par LANDRY CABRAL am scribing for the presence of Dr. Carter the following sections HISTORY OF PRESENT ILLNESS, PAST MEDICAL/FAMILY/SOCIAL HISTORY; REVIEW OF SYSTEMS; VITAL SIGNS; PHYSICAL EXAM; DISPOSITION. \par \par \par I personally performed the services described in the documentation, reviewed the documentation recorded by the scribe in my presence and it accurately and completely records my words and actions.\par \par \par

## 2023-07-20 ENCOUNTER — NON-APPOINTMENT (OUTPATIENT)
Age: 63
End: 2023-07-20

## 2023-07-20 DIAGNOSIS — Z87.39 PERSONAL HISTORY OF OTHER DISEASES OF THE MUSCULOSKELETAL SYSTEM AND CONNECTIVE TISSUE: ICD-10-CM

## 2023-07-20 DIAGNOSIS — Z86.69 PERSONAL HISTORY OF OTHER DISEASES OF THE NERVOUS SYSTEM AND SENSE ORGANS: ICD-10-CM

## 2023-07-20 DIAGNOSIS — M51.26 OTHER INTERVERTEBRAL DISC DISPLACEMENT, LUMBAR REGION: ICD-10-CM

## 2023-07-20 RX ORDER — LEVOTHYROXINE SODIUM 137 UG/1
TABLET ORAL DAILY
Refills: 0 | Status: ACTIVE | COMMUNITY

## 2023-09-20 ENCOUNTER — APPOINTMENT (OUTPATIENT)
Dept: BREAST CENTER | Facility: CLINIC | Age: 63
End: 2023-09-20
Payer: COMMERCIAL

## 2023-09-20 VITALS
BODY MASS INDEX: 28.32 KG/M2 | HEART RATE: 85 BPM | DIASTOLIC BLOOD PRESSURE: 100 MMHG | HEIGHT: 65 IN | WEIGHT: 170 LBS | SYSTOLIC BLOOD PRESSURE: 159 MMHG | RESPIRATION RATE: 16 BRPM | OXYGEN SATURATION: 96 %

## 2023-09-20 DIAGNOSIS — C50.919 MALIGNANT NEOPLASM OF UNSPECIFIED SITE OF UNSPECIFIED FEMALE BREAST: ICD-10-CM

## 2023-09-20 DIAGNOSIS — Z12.31 ENCOUNTER FOR SCREENING MAMMOGRAM FOR MALIGNANT NEOPLASM OF BREAST: ICD-10-CM

## 2023-09-20 PROCEDURE — 76642 ULTRASOUND BREAST LIMITED: CPT | Mod: 50

## 2023-09-20 PROCEDURE — 99203 OFFICE O/P NEW LOW 30 MIN: CPT | Mod: 25

## 2023-09-20 RX ORDER — NITROFURANTOIN (MONOHYDRATE/MACROCRYSTALS) 25; 75 MG/1; MG/1
100 CAPSULE ORAL
Qty: 14 | Refills: 0 | Status: DISCONTINUED | COMMUNITY
Start: 2022-08-26 | End: 2023-09-01

## 2023-09-21 PROBLEM — C50.919 BREAST CANCER: Status: ACTIVE | Noted: 2023-07-20

## 2023-10-11 ENCOUNTER — NON-APPOINTMENT (OUTPATIENT)
Age: 63
End: 2023-10-11

## 2023-10-11 RX ORDER — CLOBETASOL PROPIONATE 0.5 MG/G
0.05 OINTMENT TOPICAL
Refills: 0 | Status: ACTIVE | COMMUNITY

## 2023-11-29 ENCOUNTER — APPOINTMENT (OUTPATIENT)
Dept: RADIATION ONCOLOGY | Facility: CLINIC | Age: 63
End: 2023-11-29
Payer: COMMERCIAL

## 2023-11-29 VITALS
WEIGHT: 170 LBS | RESPIRATION RATE: 18 BRPM | BODY MASS INDEX: 28.32 KG/M2 | HEART RATE: 62 BPM | OXYGEN SATURATION: 96 % | HEIGHT: 65 IN | SYSTOLIC BLOOD PRESSURE: 134 MMHG | DIASTOLIC BLOOD PRESSURE: 86 MMHG

## 2023-11-29 DIAGNOSIS — Z17.0 MALIGNANT NEOPLASM OF UPPER-INNER QUADRANT OF RIGHT FEMALE BREAST: ICD-10-CM

## 2023-11-29 DIAGNOSIS — C50.211 MALIGNANT NEOPLASM OF UPPER-INNER QUADRANT OF RIGHT FEMALE BREAST: ICD-10-CM

## 2023-11-29 PROCEDURE — 99213 OFFICE O/P EST LOW 20 MIN: CPT

## 2023-11-29 RX ORDER — TIZANIDINE 2 MG/1
2 TABLET ORAL
Qty: 90 | Refills: 0 | Status: DISCONTINUED | COMMUNITY
Start: 2018-06-05 | End: 2023-11-29

## 2023-11-29 RX ORDER — MULTIVIT-MIN/FOLIC/VIT K/LYCOP 400-300MCG
1000 TABLET ORAL
Refills: 0 | Status: DISCONTINUED | COMMUNITY
End: 2023-11-29

## 2023-12-23 NOTE — H&P PST ADULT - NSICDXFAMILYHX_GEN_ALL_CORE_FT
Received refill request for 25 of synthroid.  Reviewed [facility] chart, and most recent synthroid dose was 75 micrograms.  Thyroid studies at goal.  Will continue 75.  Refill sent.  Updated PCP, Dr. Gomes.   Satisfactory FAMILY HISTORY:  FH: HTN (hypertension), parents

## 2024-03-20 ENCOUNTER — APPOINTMENT (OUTPATIENT)
Dept: BREAST CENTER | Facility: CLINIC | Age: 64
End: 2024-03-20
Payer: COMMERCIAL

## 2024-03-20 VITALS — BODY MASS INDEX: 28.32 KG/M2 | HEIGHT: 65 IN | WEIGHT: 170 LBS

## 2024-03-20 DIAGNOSIS — N64.89 OTHER SPECIFIED DISORDERS OF BREAST: ICD-10-CM

## 2024-03-20 PROCEDURE — 76642 ULTRASOUND BREAST LIMITED: CPT

## 2024-03-20 PROCEDURE — 99213 OFFICE O/P EST LOW 20 MIN: CPT | Mod: 25

## 2024-03-24 PROBLEM — N64.89 BREAST ASYMMETRY: Status: ACTIVE | Noted: 2024-03-20

## 2024-03-24 NOTE — ASSESSMENT
[FreeTextEntry1] : History for right breast cancer  No evidence of recurrence clinically or on ultrasound  Annual mammogram  A total of 20 minutes was spent in consultation evaluation review
Daily Assessment

## 2024-03-24 NOTE — PHYSICAL EXAM
[Atraumatic] : atraumatic [Normocephalic] : normocephalic [No Supraclavicular Adenopathy] : no supraclavicular adenopathy [Supple] : supple [No dominant masses] : no dominant masses in right breast  [No dominant masses] : no dominant masses left breast [No Nipple Retraction] : no left nipple retraction [No Nipple Discharge] : no right nipple discharge [No Axillary Lymphadenopathy] : no left axillary lymphadenopathy [No Edema] : no edema [No Ulceration] : no ulceration [No Rashes] : no rashes

## 2024-03-24 NOTE — PROCEDURE
[FreeTextEntry3] : Bilateral breast ultrasound  The patient has a history for right breast cancer  Four-quadrant survey of the right breast demonstrates no developing mass  Four-quadrant survey the left breast demonstrates no developing mass  There is no suspicious lymphadenopathy  BI-RADS 2

## 2024-03-24 NOTE — REASON FOR VISIT
[Follow-Up: _____] : a [unfilled] follow-up visit [FreeTextEntry1] : 6 month follow up. Denies new breast pain and new breast mass. Last mammogram was performed October 2023 at [facility]

## 2024-04-22 NOTE — H&P PST ADULT - NSANTHOSAYNRD_GEN_A_CORE
[facility] called  pt wants you to up Gabapentin to 300mg    she will need a new rx     Diagnoses and all orders for this visit:    Pain in both lower extremities  -     gabapentin (NEURONTIN) 300 MG capsule; Take 1 capsule (300 mg total) by mouth every evening.         No. JEET screening performed.  STOP BANG Legend: 0-2 = LOW Risk; 3-4 = INTERMEDIATE Risk; 5-8 = HIGH Risk

## 2024-07-12 ENCOUNTER — RX RENEWAL (OUTPATIENT)
Age: 64
End: 2024-07-12

## 2024-07-29 NOTE — ASU PATIENT PROFILE, ADULT - ALCOHOL USE HISTORY SINGLE SELECT
Chronology Of Present Illness: 7/29/2024- initial visit- scattered hypopigmented macules without scale, appear to be PIH 2/2 TV.  Mom relates this has been present for >1 year which seems unusual.  Treat with preventive ketoconazole and re-evaluate. Detail Level: Zone never

## 2024-09-04 ENCOUNTER — APPOINTMENT (OUTPATIENT)
Dept: BREAST CENTER | Facility: CLINIC | Age: 64
End: 2024-09-04
Payer: COMMERCIAL

## 2024-09-04 VITALS — HEIGHT: 65 IN | WEIGHT: 174 LBS | RESPIRATION RATE: 16 BRPM | BODY MASS INDEX: 28.99 KG/M2

## 2024-09-04 DIAGNOSIS — N64.89 OTHER SPECIFIED DISORDERS OF BREAST: ICD-10-CM

## 2024-09-04 PROCEDURE — 99213 OFFICE O/P EST LOW 20 MIN: CPT | Mod: 25

## 2024-09-04 PROCEDURE — 76641 ULTRASOUND BREAST COMPLETE: CPT | Mod: 50

## 2024-09-09 NOTE — PROCEDURE
[FreeTextEntry3] : Bilateral breast ultrasound  The patient has a right breast cancer  Four-quadrant survey the right breast demonstrates no developing mass  Four-quadrant survey the left breast demonstrates no developing mass  There is no suspicious lymphadenopathy  BI-RADS 2

## 2024-09-09 NOTE — HISTORY OF PRESENT ILLNESS
[FreeTextEntry1] : Denies palpable mass, breast pain, nipple discharge, redness on the skin.  The patient has a history for right breast cancer  She feels well  Denies palpable mass, pain, skin thickening

## 2024-09-09 NOTE — ASSESSMENT
[FreeTextEntry1] : History for right breast cancer  No evidence of recurrence clinically or on ultrasound  Annual mammogram  Follow-up 6 months  A total of 20 minutes was spent in consultation

## 2024-10-11 ENCOUNTER — RX RENEWAL (OUTPATIENT)
Age: 64
End: 2024-10-11

## 2024-10-15 ENCOUNTER — APPOINTMENT (OUTPATIENT)
Dept: OBGYN | Facility: CLINIC | Age: 64
End: 2024-10-15
Payer: COMMERCIAL

## 2024-10-15 ENCOUNTER — LABORATORY RESULT (OUTPATIENT)
Age: 64
End: 2024-10-15

## 2024-10-15 VITALS
BODY MASS INDEX: 28.99 KG/M2 | HEIGHT: 65 IN | SYSTOLIC BLOOD PRESSURE: 148 MMHG | WEIGHT: 174 LBS | HEART RATE: 75 BPM | DIASTOLIC BLOOD PRESSURE: 86 MMHG

## 2024-10-15 DIAGNOSIS — Z01.419 ENCOUNTER FOR GYNECOLOGICAL EXAMINATION (GENERAL) (ROUTINE) W/OUT ABNORMAL FINDINGS: ICD-10-CM

## 2024-10-15 LAB
BILIRUB UR QL STRIP: NORMAL
CLARITY UR: CLEAR
COLLECTION METHOD: NORMAL
DATE COLLECTED: NORMAL
GLUCOSE UR-MCNC: NORMAL
HCG UR QL: 0.2 EU/DL
HEMOCCULT SP1 STL QL: NEGATIVE
HEMOGLOBIN: 12.9
HGB UR QL STRIP.AUTO: NORMAL
KETONES UR-MCNC: NORMAL
LEUKOCYTE ESTERASE UR QL STRIP: NORMAL
NITRITE UR QL STRIP: NORMAL
PH UR STRIP: 5.5
PROT UR STRIP-MCNC: NORMAL
QUALITY CONTROL: YES
SP GR UR STRIP: 1.03

## 2024-10-15 PROCEDURE — 99396 PREV VISIT EST AGE 40-64: CPT

## 2024-10-15 PROCEDURE — 82270 OCCULT BLOOD FECES: CPT

## 2024-10-15 PROCEDURE — 85018 HEMOGLOBIN: CPT | Mod: QW

## 2024-10-15 PROCEDURE — 81003 URINALYSIS AUTO W/O SCOPE: CPT | Mod: QW

## 2024-10-18 ENCOUNTER — NON-APPOINTMENT (OUTPATIENT)
Age: 64
End: 2024-10-18

## 2024-10-18 DIAGNOSIS — N39.0 URINARY TRACT INFECTION, SITE NOT SPECIFIED: ICD-10-CM

## 2024-10-18 LAB
APPEARANCE: CLEAR
BILIRUBIN URINE: NEGATIVE
BLOOD URINE: ABNORMAL
COLOR: YELLOW
GLUCOSE QUALITATIVE U: NEGATIVE MG/DL
KETONES URINE: NEGATIVE MG/DL
LEUKOCYTE ESTERASE URINE: ABNORMAL
NITRITE URINE: POSITIVE
PH URINE: 5.5
PROTEIN URINE: NEGATIVE MG/DL
SPECIFIC GRAVITY URINE: 1.02
UROBILINOGEN URINE: 0.2 MG/DL

## 2024-10-18 RX ORDER — SULFAMETHOXAZOLE AND TRIMETHOPRIM 800; 160 MG/1; MG/1
800-160 TABLET ORAL TWICE DAILY
Qty: 14 | Refills: 0 | Status: ACTIVE | COMMUNITY
Start: 2024-10-18 | End: 1900-01-01

## 2024-10-22 LAB
BACTERIA UR CULT: ABNORMAL
CYTOLOGY CVX/VAG DOC THIN PREP: NORMAL

## 2024-10-23 ENCOUNTER — NON-APPOINTMENT (OUTPATIENT)
Age: 64
End: 2024-10-23

## 2025-03-05 ENCOUNTER — APPOINTMENT (OUTPATIENT)
Dept: BREAST CENTER | Facility: CLINIC | Age: 65
End: 2025-03-05

## 2025-03-05 VITALS
WEIGHT: 180 LBS | BODY MASS INDEX: 29.99 KG/M2 | RESPIRATION RATE: 16 BRPM | DIASTOLIC BLOOD PRESSURE: 108 MMHG | HEIGHT: 65 IN | HEART RATE: 87 BPM | SYSTOLIC BLOOD PRESSURE: 151 MMHG

## 2025-03-05 PROCEDURE — 99213 OFFICE O/P EST LOW 20 MIN: CPT | Mod: 25

## 2025-03-05 PROCEDURE — 76641 ULTRASOUND BREAST COMPLETE: CPT | Mod: 50

## 2025-06-18 ENCOUNTER — NON-APPOINTMENT (OUTPATIENT)
Age: 65
End: 2025-06-18

## 2025-07-01 ENCOUNTER — RX RENEWAL (OUTPATIENT)
Age: 65
End: 2025-07-01

## 2025-09-13 ENCOUNTER — NON-APPOINTMENT (OUTPATIENT)
Age: 65
End: 2025-09-13

## 2025-09-15 ENCOUNTER — NON-APPOINTMENT (OUTPATIENT)
Age: 65
End: 2025-09-15